# Patient Record
Sex: FEMALE | Race: WHITE | NOT HISPANIC OR LATINO | Employment: UNEMPLOYED | URBAN - METROPOLITAN AREA
[De-identification: names, ages, dates, MRNs, and addresses within clinical notes are randomized per-mention and may not be internally consistent; named-entity substitution may affect disease eponyms.]

---

## 2019-04-04 ENCOUNTER — OFFICE VISIT (OUTPATIENT)
Dept: OBSTETRICS AND GYNECOLOGY | Age: 24
End: 2019-04-04

## 2019-04-04 VITALS
SYSTOLIC BLOOD PRESSURE: 104 MMHG | DIASTOLIC BLOOD PRESSURE: 68 MMHG | WEIGHT: 151.4 LBS | HEIGHT: 64 IN | BODY MASS INDEX: 25.85 KG/M2

## 2019-04-04 DIAGNOSIS — R30.0 DYSURIA: ICD-10-CM

## 2019-04-04 DIAGNOSIS — Z13.89 SCREENING FOR BLOOD OR PROTEIN IN URINE: ICD-10-CM

## 2019-04-04 DIAGNOSIS — Z01.419 WELL WOMAN EXAM WITH ROUTINE GYNECOLOGICAL EXAM: Primary | ICD-10-CM

## 2019-04-04 LAB
BILIRUB BLD-MCNC: NEGATIVE MG/DL
CLARITY, POC: CLEAR
COLOR UR: YELLOW
GLUCOSE UR STRIP-MCNC: NEGATIVE MG/DL
KETONES UR QL: NEGATIVE
LEUKOCYTE EST, POC: NEGATIVE
NITRITE UR-MCNC: NEGATIVE MG/ML
PH UR: 5.5 [PH] (ref 5–8)
PROT UR STRIP-MCNC: NEGATIVE MG/DL
RBC # UR STRIP: ABNORMAL /UL
SP GR UR: 1.02 (ref 1–1.03)
UROBILINOGEN UR QL: NORMAL

## 2019-04-04 PROCEDURE — 99213 OFFICE O/P EST LOW 20 MIN: CPT | Performed by: NURSE PRACTITIONER

## 2019-04-04 PROCEDURE — 99395 PREV VISIT EST AGE 18-39: CPT | Performed by: NURSE PRACTITIONER

## 2019-04-04 NOTE — PROGRESS NOTES
[facility]  Routine Annual Visit    2019    Patient: Juana Freeman          MR#:[id number]      History of Present Illness     24 y.o. female  who presents for annual exam. Last pap ASCUS HPV negative . Juana is not currently using contraception but she is interested in LARC. Reports difficulty remembering to take the pill and trouble getting to office every 3 months for depo injections in the past. She is most interested in IUD. She reports normal, monthly cycles. Denies new medical hx. She complains today of intermittent burning with urination and pelvic pain. She states she has had these symptoms off and on since for years but has noticed progressively worsening. Denies fever/chillls or associated symptoms. She denies any new partners or exposure to STI.     Patient's last menstrual period was 2019 (approximate).  Obstetric History:  OB History      Para Term  AB Living    1 1 1     1    SAB TAB Ectopic Molar Multiple Live Births            0 1         Menstrual History:     Patient's last menstrual period was 2019 (approximate).       Sexual History:       ________________________________________  Patient Active Problem List   Diagnosis   • Depo-Provera contraceptive status       Past Medical History:   Diagnosis Date   • Anemia     with pregnancy   • Asthma    • GERD (gastroesophageal reflux disease)    • Gestational diabetes    • Hydronephrosis of right kidney     with pregnancy   • Migraine        Past Surgical History:   Procedure Laterality Date   •  SECTION N/A 2016    Procedure:  SECTION PRIMARY;  Surgeon: Ellen Hoover MD;  Location: [facility] LABOR DELIVERY;  Service:    • EAR TUBE REMOVAL     • TONSILLECTOMY         Social History     Tobacco Use   Smoking Status Never Smoker   Smokeless Tobacco Never Used       Family History   Problem Relation Age of Onset   • Cancer Mother    • Stroke Mother    • Clotting  disorder Mother    • No Known Problems Father    • No Known Problems Maternal Grandmother    • No Known Problems Maternal Grandfather    • Cancer Paternal Grandmother    • No Known Problems Paternal Grandfather        Prior to Admission medications    Medication Sig Start Date End Date Taking? Authorizing Provider   famotidine (PEPCID) 20 MG tablet take 1 tablet by mouth twice a day 2/6/16  Yes Celso Callaway MD   Fluticasone Propionate HFA (FLOVENT HFA IN) Inhale as needed.   Yes Celso Callaway MD   VENTOLIN  (90 BASE) MCG/ACT inhaler inhale 1 to 2 puffs by mouth every 4 to 6 hours if needed 1/27/16  Yes Celso Callaway MD     ________________________________________    The following portions of the patient's history were reviewed and updated as appropriate: allergies, current medications, past family history, past medical history, past social history, past surgical history and problem list.    Review of Systems   Constitutional: Negative.    HENT: Negative.    Eyes: Negative for visual disturbance.   Respiratory: Negative for cough, shortness of breath and wheezing.    Cardiovascular: Negative for chest pain, palpitations and leg swelling.   Gastrointestinal: Negative for abdominal distention, abdominal pain, blood in stool, constipation, diarrhea, nausea and vomiting.   Endocrine: Negative for cold intolerance and heat intolerance.   Genitourinary: Positive for dysuria and pelvic pain. Negative for difficulty urinating, dyspareunia, frequency, genital sores, hematuria, menstrual problem, urgency, vaginal bleeding, vaginal discharge and vaginal pain.   Musculoskeletal: Negative.    Skin: Negative.    Neurological: Negative for dizziness, weakness, light-headedness, numbness and headaches.   Hematological: Negative.    Psychiatric/Behavioral: Negative.    Breasts: negative for lumps skin changes, dimpling, swelling, nipple changes/discharge bilaterally       Objective   Physical  "Exam    /68   Ht 162.6 cm (64\")   Wt 68.7 kg (151 lb 6.4 oz)   LMP 03/28/2019 (Approximate)   Breastfeeding? No   BMI 25.99 kg/m²    BP Readings from Last 3 Encounters:   04/04/19 104/68   11/28/16 110/68   07/11/16 113/64      Wt Readings from Last 3 Encounters:   04/04/19 68.7 kg (151 lb 6.4 oz)   11/28/16 69.9 kg (154 lb)   07/11/16 65.8 kg (145 lb)        BMI: Estimated body mass index is 25.99 kg/m² as calculated from the following:    Height as of this encounter: 162.6 cm (64\").    Weight as of this encounter: 68.7 kg (151 lb 6.4 oz).            General:   alert, appears stated age and cooperative   Heart: regular rate and rhythm, S1, S2 normal, no murmur, click, rub or gallop   Lungs: clear to auscultation bilaterally   Abdomen: soft, non-tender, without masses or organomegaly   Breast: inspection negative, no nipple discharge or bleeding, no masses or nodularity palpable   Vulva: normal   Vagina: normal mucosa, normal discharge   Cervix: no cervical motion tenderness and no lesions   Uterus: normal size, mobile, non-tender or normal shape and consistency   Adnexa: no mass, fullness, tenderness     Assessment:    1. Normal annual exam   2. Dysuria/pelvic pain- exam unremarkable- no acute findings. Symptoms are not described as acute. Nuswab and urine culture sent. If negative, pt to schedule T/V US. Juana verbalizes understanding and agrees with plan  3. Contraception- all options discussed in detail. Juana desires kyleena. Risks, benefits, possible side effects, and insertion technique discussed in detail. Advised no unprotected IC until insertion as can be no chance of pregnancy. Recommended premedicate with 600 mg ibuprofen. We will get her precerted for this and she will return on her menses for insertion.   4.  Counseled on healthy lifestyle modifications, safe sex, condom use, and self breast exams.        Plan:    []  Rx:   []  Mammogram request made  [x]  PAP done  []  Occult fecal " blood test (Insure)  []  Labs:   [x]  GC/Chl/TV  []  DEXA scan   []  Referral for colonoscopy:           BRAYAN Madden  4/4/2019 8:59 AM

## 2019-04-06 LAB
BACTERIA UR CULT: NORMAL
BACTERIA UR CULT: NORMAL

## 2019-04-08 LAB
CONV .: NORMAL
CYTOLOGIST CVX/VAG CYTO: NORMAL
CYTOLOGY CVX/VAG DOC THIN PREP: NORMAL
DX ICD CODE: NORMAL
HIV 1 & 2 AB SER-IMP: NORMAL
OTHER STN SPEC: NORMAL
PATH REPORT.FINAL DX SPEC: NORMAL
STAT OF ADQ CVX/VAG CYTO-IMP: NORMAL

## 2019-04-09 ENCOUNTER — TELEPHONE (OUTPATIENT)
Dept: OBSTETRICS AND GYNECOLOGY | Age: 24
End: 2019-04-09

## 2019-04-09 LAB
C TRACH RRNA SPEC QL NAA+PROBE: NEGATIVE
N GONORRHOEA RRNA SPEC QL NAA+PROBE: NEGATIVE
T VAGINALIS RRNA VAG QL NAA+PROBE: NEGATIVE

## 2019-04-09 NOTE — TELEPHONE ENCOUNTER
Patient state she will be out of town for a month and does not know when she will be back.  She is leaving today.  Advised her to call and schedule appointments when she comes back.  juana

## 2019-04-09 NOTE — TELEPHONE ENCOUNTER
----- Message from BRAYAN Fischer sent at 4/9/2019  9:42 AM EDT -----  Please inform patient her pap smear, urine culture, and vaginal culture all returned negative. Since this is all normal, as discussed at her visit I recommend coming in for U/S to further evaluate her symptoms. Please schedule her for US and follow up with me following next week. Thank you.

## 2019-04-12 NOTE — TELEPHONE ENCOUNTER
Attempted to call patient to schedule appointments.  Lmvm (personal Voicemail) to call office to schedule follow up and ultrasound.  juana

## 2019-04-12 NOTE — TELEPHONE ENCOUNTER
Patient returned call and scheduled U/S and follow up appointments for 06/03/19.  She will not be back in town until the end of May.  juana

## 2019-04-30 ENCOUNTER — TELEPHONE (OUTPATIENT)
Dept: OBSTETRICS AND GYNECOLOGY | Age: 24
End: 2019-04-30

## 2019-04-30 NOTE — TELEPHONE ENCOUNTER
Spoke with patient. She has decided against the Kyleena and prefers not to reschedule this appt.  4/30/19

## 2019-04-30 NOTE — TELEPHONE ENCOUNTER
Attempted to call patient and reschedule Kyleena insertion. Left voicemail. IUD Device will be returned back to stock today 4/30. If patient calls back to schedule, pull another from stock.. Buy and Bill